# Patient Record
Sex: FEMALE | Race: WHITE | NOT HISPANIC OR LATINO | URBAN - METROPOLITAN AREA
[De-identification: names, ages, dates, MRNs, and addresses within clinical notes are randomized per-mention and may not be internally consistent; named-entity substitution may affect disease eponyms.]

---

## 2024-06-05 ENCOUNTER — EMERGENCY (EMERGENCY)
Facility: HOSPITAL | Age: 34
LOS: 1 days | Discharge: HOME | End: 2024-06-05
Admitting: EMERGENCY MEDICINE
Payer: COMMERCIAL

## 2024-06-05 VITALS
RESPIRATION RATE: 17 BRPM | DIASTOLIC BLOOD PRESSURE: 79 MMHG | HEART RATE: 90 BPM | SYSTOLIC BLOOD PRESSURE: 122 MMHG | OXYGEN SATURATION: 99 % | TEMPERATURE: 99 F

## 2024-06-05 DIAGNOSIS — O20.9 HEMORRHAGE IN EARLY PREGNANCY, UNSPECIFIED: ICD-10-CM

## 2024-06-05 DIAGNOSIS — O03.9 COMPLETE OR UNSPECIFIED SPONTANEOUS ABORTION WITHOUT COMPLICATION: ICD-10-CM

## 2024-06-05 LAB
ALBUMIN SERPL ELPH-MCNC: 3.7 G/DL — SIGNIFICANT CHANGE UP (ref 3.4–5)
ALP SERPL-CCNC: 69 U/L — SIGNIFICANT CHANGE UP (ref 40–120)
ALT FLD-CCNC: 19 U/L — SIGNIFICANT CHANGE UP (ref 12–42)
ANION GAP SERPL CALC-SCNC: 9 MMOL/L — SIGNIFICANT CHANGE UP (ref 9–16)
APPEARANCE UR: CLEAR — SIGNIFICANT CHANGE UP
APTT BLD: 32.2 SEC — SIGNIFICANT CHANGE UP (ref 24.5–35.6)
AST SERPL-CCNC: 30 U/L — SIGNIFICANT CHANGE UP (ref 15–37)
BACTERIA # UR AUTO: ABNORMAL /HPF
BASOPHILS # BLD AUTO: 0.04 K/UL — SIGNIFICANT CHANGE UP (ref 0–0.2)
BASOPHILS NFR BLD AUTO: 0.5 % — SIGNIFICANT CHANGE UP (ref 0–2)
BILIRUB SERPL-MCNC: 0.5 MG/DL — SIGNIFICANT CHANGE UP (ref 0.2–1.2)
BILIRUB UR-MCNC: NEGATIVE — SIGNIFICANT CHANGE UP
BLD GP AB SCN SERPL QL: NEGATIVE — SIGNIFICANT CHANGE UP
BUN SERPL-MCNC: 11 MG/DL — SIGNIFICANT CHANGE UP (ref 7–23)
CALCIUM SERPL-MCNC: 9.6 MG/DL — SIGNIFICANT CHANGE UP (ref 8.5–10.5)
CHLORIDE SERPL-SCNC: 106 MMOL/L — SIGNIFICANT CHANGE UP (ref 96–108)
CO2 SERPL-SCNC: 23 MMOL/L — SIGNIFICANT CHANGE UP (ref 22–31)
COLOR SPEC: YELLOW — SIGNIFICANT CHANGE UP
CREAT SERPL-MCNC: 0.53 MG/DL — SIGNIFICANT CHANGE UP (ref 0.5–1.3)
DIFF PNL FLD: ABNORMAL
EGFR: 126 ML/MIN/1.73M2 — SIGNIFICANT CHANGE UP
EOSINOPHIL # BLD AUTO: 0.03 K/UL — SIGNIFICANT CHANGE UP (ref 0–0.5)
EOSINOPHIL NFR BLD AUTO: 0.3 % — SIGNIFICANT CHANGE UP (ref 0–6)
EPI CELLS # UR: PRESENT
GLUCOSE SERPL-MCNC: 102 MG/DL — HIGH (ref 70–99)
GLUCOSE UR QL: NEGATIVE MG/DL — SIGNIFICANT CHANGE UP
GRAN CASTS # UR COMP ASSIST: SIGNIFICANT CHANGE UP
HCG SERPL-ACNC: 2976 MIU/ML — HIGH
HCT VFR BLD CALC: 41.1 % — SIGNIFICANT CHANGE UP (ref 34.5–45)
HGB BLD-MCNC: 13.6 G/DL — SIGNIFICANT CHANGE UP (ref 11.5–15.5)
HYALINE CASTS # UR AUTO: SIGNIFICANT CHANGE UP
IMM GRANULOCYTES NFR BLD AUTO: 0.1 % — SIGNIFICANT CHANGE UP (ref 0–0.9)
INR BLD: 1.18 — SIGNIFICANT CHANGE UP (ref 0.85–1.18)
KETONES UR-MCNC: NEGATIVE MG/DL — SIGNIFICANT CHANGE UP
LEUKOCYTE ESTERASE UR-ACNC: NEGATIVE — SIGNIFICANT CHANGE UP
LYMPHOCYTES # BLD AUTO: 1.61 K/UL — SIGNIFICANT CHANGE UP (ref 1–3.3)
LYMPHOCYTES # BLD AUTO: 18.7 % — SIGNIFICANT CHANGE UP (ref 13–44)
MCHC RBC-ENTMCNC: 29.8 PG — SIGNIFICANT CHANGE UP (ref 27–34)
MCHC RBC-ENTMCNC: 33.1 GM/DL — SIGNIFICANT CHANGE UP (ref 32–36)
MCV RBC AUTO: 90.1 FL — SIGNIFICANT CHANGE UP (ref 80–100)
MONOCYTES # BLD AUTO: 0.4 K/UL — SIGNIFICANT CHANGE UP (ref 0–0.9)
MONOCYTES NFR BLD AUTO: 4.7 % — SIGNIFICANT CHANGE UP (ref 2–14)
NEUTROPHILS # BLD AUTO: 6.51 K/UL — SIGNIFICANT CHANGE UP (ref 1.8–7.4)
NEUTROPHILS NFR BLD AUTO: 75.7 % — SIGNIFICANT CHANGE UP (ref 43–77)
NITRITE UR-MCNC: NEGATIVE — SIGNIFICANT CHANGE UP
NRBC # BLD: 0 /100 WBCS — SIGNIFICANT CHANGE UP (ref 0–0)
PH UR: 7 — SIGNIFICANT CHANGE UP (ref 5–8)
PLATELET # BLD AUTO: 255 K/UL — SIGNIFICANT CHANGE UP (ref 150–400)
POTASSIUM SERPL-MCNC: 4.4 MMOL/L — SIGNIFICANT CHANGE UP (ref 3.5–5.3)
POTASSIUM SERPL-SCNC: 4.4 MMOL/L — SIGNIFICANT CHANGE UP (ref 3.5–5.3)
PROT SERPL-MCNC: 7.9 G/DL — SIGNIFICANT CHANGE UP (ref 6.4–8.2)
PROT UR-MCNC: NEGATIVE MG/DL — SIGNIFICANT CHANGE UP
PROTHROM AB SERPL-ACNC: 13.3 SEC — HIGH (ref 9.5–13)
RBC # BLD: 4.56 M/UL — SIGNIFICANT CHANGE UP (ref 3.8–5.2)
RBC # FLD: 12.2 % — SIGNIFICANT CHANGE UP (ref 10.3–14.5)
RBC CASTS # UR COMP ASSIST: 5 /HPF — HIGH (ref 0–4)
RH IG SCN BLD-IMP: POSITIVE — SIGNIFICANT CHANGE UP
SODIUM SERPL-SCNC: 138 MMOL/L — SIGNIFICANT CHANGE UP (ref 132–145)
SP GR SPEC: 1.02 — SIGNIFICANT CHANGE UP (ref 1–1.03)
UROBILINOGEN FLD QL: 0.2 MG/DL — SIGNIFICANT CHANGE UP (ref 0.2–1)
WBC # BLD: 8.6 K/UL — SIGNIFICANT CHANGE UP (ref 3.8–10.5)
WBC # FLD AUTO: 8.6 K/UL — SIGNIFICANT CHANGE UP (ref 3.8–10.5)
WBC UR QL: 0 /HPF — SIGNIFICANT CHANGE UP (ref 0–5)

## 2024-06-05 PROCEDURE — 76801 OB US < 14 WKS SINGLE FETUS: CPT | Mod: 26

## 2024-06-05 PROCEDURE — 99284 EMERGENCY DEPT VISIT MOD MDM: CPT

## 2024-06-05 PROCEDURE — 76817 TRANSVAGINAL US OBSTETRIC: CPT | Mod: 26

## 2024-06-05 NOTE — ED PROVIDER NOTE - PATIENT PORTAL LINK FT
You can access the FollowMyHealth Patient Portal offered by [facility] by registering at the following website: [email or web address]. By joining [employer]’s FollowMyHealth portal, you will also be able to view your health information using other applications (apps) compatible with our system.

## 2024-06-05 NOTE — ED ADULT NURSE NOTE - DOES PATIENT HAVE ADVANCE DIRECTIVE
soft/nontender/no distention/no masses palpable/bowel sounds normal/no bruit/no rebound tenderness/no guarding/no rigidity No

## 2024-06-05 NOTE — ED ADULT NURSE NOTE - OBJECTIVE STATEMENT
Patient is a 33 y/o F c/o pregnancy problem. Patient reports vaginal bleeding and cramping prior to arrival. Patient reports 5 weeks pregnant confirmed at  through urine pregnancy test. Patient reports bleeding is more than period. Patient denies fever or chills

## 2024-06-05 NOTE — ED PROVIDER NOTE - CLINICAL SUMMARY MEDICAL DECISION MAKING FREE TEXT BOX
34-year-old female presents this emergency department for pelvic cramping, vaginal bleeding, in the setting positive pregnancy test  Patient went to an urgent care prior to arrival, who sent her here for an ultrasound  Labs, coags, hCG, type and screen, UA with reflex culture ordered  Pelvic ultrasound ordered  Will continue to monitor patient

## 2024-06-05 NOTE — ED PROVIDER NOTE - PROGRESS NOTE DETAILS
Patient sitting comfortably room, no acute stress  hCG is still pending  CBC and CMP are unremarkable  UA shows no infection  Ultrasound done, shows no obvious IUP.  Differentials to include early pregnancy, ectopic, or complete miscarriage, and recommend repeat BHcg in 48 hours and repeat U/S in 5-7 days.  Patient states that she is feeling better  Spoke with lab regarding patient's hCG, and states that the "machine is putting out a lot of different numbers and we have to dilute it".  States that will be another 20 minutes  Will continue to monitor patient hCG quantitative is 2900  Inform patient that she needs to come back in 48 hours for repeat hCG testing.  Strict return precautions and bleeding precautions given to patient.  Patient is requesting to be discharged now  All results with patient.  Patient understands and agrees with plan.  Agreed to follow primary care doctor in 2 to 3 days.

## 2024-06-05 NOTE — ED ADULT TRIAGE NOTE - CHIEF COMPLAINT QUOTE
patient last mentrual cycle was over 4 weeks ago. + pregnancy test at home + bleeding and increased cramping last 3 days. sent from [facility]

## 2024-06-05 NOTE — ED PROVIDER NOTE - NSFOLLOWUPINSTRUCTIONS_ED_ALL_ED_FT
Overview  For some, the loss of a pregnancy can be very hard. You may wonder why it happened. Miscarriages are common and are not caused by exercise, stress, or sex. Most happen because the fertilized egg in the uterus does not develop normally.    There is no treatment that can stop a miscarriage. If you are having a miscarriage, you have several options. As long as you do not have heavy blood loss, fever, weakness, or other signs of infection, you can let a miscarriage follow its own course. This can take several days. If you don't want to wait, you can take medicine to help the pregnancy tissue pass. Or you can have a surgical procedure to remove the tissue.    Your body will recover over the next several weeks. Having a miscarriage does not mean you cannot have a normal pregnancy in the future.    Follow-up care is a key part of your treatment and safety. Be sure to make and go to all appointments, and call your doctor or nurse advice line (455 in most provinces and [place]) if you are having problems. It's also a good idea to know your test results and keep a list of the medicines you take.    How can you care for yourself at home?  You will probably have some vaginal bleeding for 1 to 2 weeks. It may be similar to or slightly heavier than a normal period. The bleeding should get lighter after a week. Use sanitary pads until you stop bleeding. Using pads makes it easier to monitor your bleeding.  Take an over-the-counter pain medicine, such as acetaminophen (Tylenol) for cramps. Talk to your doctor before you take ibuprofen (Advil, Motrin) or naproxen (Aleve). Read and follow all instructions on the label. You may have cramps for several days after the miscarriage.  Do not take two or more pain medicines at the same time unless the doctor or midwife told you to. Many pain medicines have acetaminophen, which is Tylenol. Too much acetaminophen (Tylenol) can be harmful.  Ask your doctor when it is okay for you to have sex.  You may return to your normal activities if you feel well enough to do so.  If you would like to try to get pregnant again, it is usually safe whenever you feel ready. Talk with your doctor about any future pregnancy plans.  If you do not want to get pregnant, ask your doctor or midwife about birth control. You can get pregnant again before your next period starts if you are not using birth control.  You may be low in iron because of blood loss. Eat a balanced diet that is high in iron and vitamin C. Foods rich in iron include red meat, shellfish, eggs, beans, and leafy green vegetables. Foods high in vitamin C include citrus fruits, tomatoes, and broccoli. Talk to your doctor about whether you need to take iron pills or a multivitamin.  For some, the loss of a pregnancy can be very hard. You may have a range of emotions. If you need help coping, talking to family members, friends, a counsellor, or your doctor may help.  Watch the video series: A parent’s view of pregnancy and infant loss. These videos show the different experiences of many families going through loss. Families share stories about the time of their loss, personal grief journeys, and the challenges and decisions they faced. They have shared their stories to help others navigate this difficult time.  When should you call for help?  	  Call 911 anytime you think you may need emergency care. For example, call if:    You passed out (lost consciousness).  You have severe vaginal bleeding.  You are dizzy or light-headed, or you feel like you may faint.  Call your doctor, midwife, or nurse advice line now or seek immediate medical care if:    You have new or worse pain in your belly or pelvis.  You have a fever.  You have vaginal discharge that smells bad.  Watch closely for changes in your health, and be sure to contact your doctor, midwife, or nurse advice line if:

## 2024-06-05 NOTE — ED PROVIDER NOTE - OBJECTIVE STATEMENT
34-year-old female, , LMP May 5, 2024, currently unknown weeks gestation, presents to this emergency department for vaginal bleeding and a positive pregnancy test.  Patient noted that her menses was late 2 weeks ago, so she took a pregnancy test which was positive.  Today she went to an urgent care because she started spotting.  Urgent care sent her here for an ultrasound.  Patient states that she has not followed up with an OB, has not had any ultrasounds and for this pregnancy.  States that she is going through about 1 pad every 5-6 hours.  States that she is also cramping, and it is all suprapubic cramping.  Denies any one-sided cramping.  Denies any nausea, vomiting.  Denies any fevers.  Has not tried any medications for symptomatic relief.